# Patient Record
Sex: MALE | Race: BLACK OR AFRICAN AMERICAN | NOT HISPANIC OR LATINO | Employment: FULL TIME | URBAN - METROPOLITAN AREA
[De-identification: names, ages, dates, MRNs, and addresses within clinical notes are randomized per-mention and may not be internally consistent; named-entity substitution may affect disease eponyms.]

---

## 2021-07-01 ENCOUNTER — HOSPITAL ENCOUNTER (EMERGENCY)
Facility: HOSPITAL | Age: 26
Discharge: HOME | End: 2021-07-01
Attending: EMERGENCY MEDICINE | Admitting: EMERGENCY MEDICINE

## 2021-07-01 ENCOUNTER — APPOINTMENT (OUTPATIENT)
Dept: GENERAL RADIOLOGY | Facility: HOSPITAL | Age: 26
End: 2021-07-01

## 2021-07-01 VITALS
RESPIRATION RATE: 20 BRPM | HEART RATE: 84 BPM | HEIGHT: 68 IN | BODY MASS INDEX: 31.31 KG/M2 | SYSTOLIC BLOOD PRESSURE: 146 MMHG | OXYGEN SATURATION: 98 % | TEMPERATURE: 98.9 F | WEIGHT: 206.57 LBS | DIASTOLIC BLOOD PRESSURE: 84 MMHG

## 2021-07-01 DIAGNOSIS — S89.92XA LEFT KNEE INJURY, INITIAL ENCOUNTER: Primary | ICD-10-CM

## 2021-07-01 DIAGNOSIS — S83.512A RUPTURE OF ANTERIOR CRUCIATE LIGAMENT OF LEFT KNEE, INITIAL ENCOUNTER: ICD-10-CM

## 2021-07-01 DIAGNOSIS — M25.462 KNEE EFFUSION, LEFT: ICD-10-CM

## 2021-07-01 PROCEDURE — 99283 EMERGENCY DEPT VISIT LOW MDM: CPT

## 2021-07-01 PROCEDURE — 73562 X-RAY EXAM OF KNEE 3: CPT

## 2021-07-01 PROCEDURE — 73562 X-RAY EXAM OF KNEE 3: CPT | Performed by: RADIOLOGY

## 2021-07-01 NOTE — ED PROVIDER NOTES
Subjective   Patient reports that he was playing basketball earlier today when another player fell into his left lateral leg causing his knee to pop inward.  He states he then fell to the ground and his knee popped back into place.  He now reports pain and swelling on his knee with difficulty walking.  He is able to put some weight on that leg.      History provided by:  Patient   used: No        Review of Systems   Musculoskeletal: Positive for arthralgias (Left knee) and joint swelling (Left knee).   All other systems reviewed and are negative.      History reviewed. No pertinent past medical history.    No Known Allergies    History reviewed. No pertinent surgical history.    History reviewed. No pertinent family history.    Social History     Socioeconomic History   • Marital status: Single     Spouse name: Not on file   • Number of children: Not on file   • Years of education: Not on file   • Highest education level: Not on file           Objective   Physical Exam  Vitals and nursing note reviewed.   Constitutional:       Appearance: Normal appearance. He is normal weight.   HENT:      Head: Normocephalic and atraumatic.   Eyes:      Pupils: Pupils are equal, round, and reactive to light.   Cardiovascular:      Rate and Rhythm: Normal rate.   Pulmonary:      Effort: Pulmonary effort is normal.   Musculoskeletal:         General: Swelling (Left knee), tenderness (Left knee) and signs of injury (Left knee) present.      Cervical back: Normal range of motion and neck supple.   Skin:     General: Skin is warm and dry.   Neurological:      General: No focal deficit present.      Mental Status: He is oriented to person, place, and time.   Psychiatric:         Mood and Affect: Mood normal.         Behavior: Behavior normal.         Thought Content: Thought content normal.         Judgment: Judgment normal.         Procedures           ED Course  ED Course as of Jul 02 0030 Fri Jul 02, 2021   0029  Discussed patient case with Dr. Pinedo   (7/1/21 @2040) who agrees with my plan of care to place a knee immobilizer and put the patient on crutches for follow-up in his office on Tuesday.  She was provided with his contact information and advised to schedule an appointment tomorrow.  Patient declines pain medicine and states that he will use ibuprofen if needed    [MH]      ED Course User Index  [MH] Margarita Pascual APRN                                           MDM  Number of Diagnoses or Management Options  Knee effusion, left: new and requires workup  Left knee injury, initial encounter: new and requires workup  possible Rupture of anterior cruciate ligament of left knee, initial encounter: new and requires workup     Amount and/or Complexity of Data Reviewed  Tests in the radiology section of CPT®: reviewed and ordered  Discuss the patient with other providers: yes    Patient Progress  Patient progress: stable      Final diagnoses:   Left knee injury, initial encounter   Knee effusion, left   possible Rupture of anterior cruciate ligament of left knee, initial encounter       ED Disposition  ED Disposition     ED Disposition Condition Comment    Discharge Stable           Hilda Lopez, MURRAY  [address] [address]  [phone]      As needed    Mason Pinedo MD  [address] [place], [place] [id number] [phone]    Call   Call tomorrow for appointment on Tuesday per Dr. Pinedo's instructions.         Medication List      No changes were made to your prescriptions during this visit.          Margarita Pascual APRN  07/02/21 0030